# Patient Record
Sex: FEMALE | Race: WHITE | NOT HISPANIC OR LATINO | Employment: OTHER | URBAN - METROPOLITAN AREA
[De-identification: names, ages, dates, MRNs, and addresses within clinical notes are randomized per-mention and may not be internally consistent; named-entity substitution may affect disease eponyms.]

---

## 2020-08-26 ENCOUNTER — OFFICE VISIT (OUTPATIENT)
Dept: ORTHOPEDIC SURGERY | Facility: CLINIC | Age: 50
End: 2020-08-26

## 2020-08-26 VITALS — HEIGHT: 68 IN | HEART RATE: 85 BPM | BODY MASS INDEX: 18.64 KG/M2 | WEIGHT: 123 LBS | OXYGEN SATURATION: 95 %

## 2020-08-26 DIAGNOSIS — M79.672 LEFT FOOT PAIN: Primary | ICD-10-CM

## 2020-08-26 DIAGNOSIS — M25.571 RIGHT ANKLE PAIN, UNSPECIFIED CHRONICITY: ICD-10-CM

## 2020-08-26 DIAGNOSIS — M21.6X2 ACQUIRED METATARSUS VARUS OF LEFT FOOT: ICD-10-CM

## 2020-08-26 PROCEDURE — 99204 OFFICE O/P NEW MOD 45 MIN: CPT | Performed by: ORTHOPAEDIC SURGERY

## 2020-08-26 RX ORDER — LAMOTRIGINE 200 MG/1
300 TABLET ORAL DAILY
COMMUNITY
End: 2022-12-06

## 2020-08-26 RX ORDER — PERPHENAZINE 16 MG/1
16 TABLET ORAL DAILY
COMMUNITY

## 2020-08-26 NOTE — PROGRESS NOTES
NEW PATIENT    Patient: Roberta Stoddard  : 1970    Primary Care Provider: Rosmery Seaman MD    Requesting Provider: As above    Pain of the Left Foot      History    Chief Complaint: Left bunion pain, right ankle pain    History of Present Illness: This is an extremely pleasant 50-year-old woman with 2-year history of left bunion pain.  She has milder bunion on the right with no pain.  She has changed her shoes, she has difficulty with any type of shoe wear.  She does not have any family history of bunions.  She reports that her mother told her she has the bunion because she wore pointy toed shoes.  I explained that bunions are hereditary often, but they indeed can be made worse by shoe wear.  The right ankle pain has been present for a month or 2, it only hurts with pressure over the sinus Tarsi area.  She does not remember any injury, she has not noticed any swelling, no locking, no giving way.  She reports that she has been thinking about bunion surgery, and she thinks it might be time to pursue this because of the persistent pain.  She notes that it often throbs at night, I explained that is due to pressure on the digital nerve    Current Outpatient Medications on File Prior to Visit   Medication Sig Dispense Refill   • FLUoxetine HCl (PROZAC PO) Take 60 mg by mouth.     • lamoTRIgine (LaMICtal) 200 MG tablet Take 300 mg by mouth Daily.     • perphenazine 16 MG tablet Take 16 mg by mouth Daily.       No current facility-administered medications on file prior to visit.       Allergies   Allergen Reactions   • Morphine Other (See Comments)     blackout      History reviewed. No pertinent past medical history.  Past Surgical History:   Procedure Laterality Date   •  SECTION     • COLONOSCOPY      partial   • NASAL SEPTUM SURGERY       Family History   Problem Relation Age of Onset   • No Known Problems Mother    • Cancer Father       Social History     Socioeconomic History   • Marital  "status:      Spouse name: Not on file   • Number of children: Not on file   • Years of education: Not on file   • Highest education level: Not on file   Tobacco Use   • Smoking status: Never Smoker   • Smokeless tobacco: Never Used   Substance and Sexual Activity   • Alcohol use: Yes     Frequency: Never   • Drug use: Never   • Sexual activity: Defer        Review of Systems   Constitutional: Negative.    HENT: Negative.    Eyes: Negative.    Respiratory: Negative.    Cardiovascular: Negative.    Gastrointestinal: Negative.    Endocrine: Negative.    Genitourinary: Negative.    Musculoskeletal: Positive for arthralgias and joint swelling.   Skin: Negative.    Allergic/Immunologic: Negative.    Neurological: Negative.    Hematological: Negative.    Psychiatric/Behavioral: Negative.        The following portions of the patient's history were reviewed and updated as appropriate: allergies, current medications, past family history, past medical history, past social history, past surgical history and problem list.    Physical Exam:   Pulse 85   Ht 171.5 cm (67.5\")   Wt 55.8 kg (123 lb)   SpO2 95%   Breastfeeding No   BMI 18.98 kg/m²   GENERAL: Body habitus: normal weight for height    Lower extremity edema: Right: none; Left: none    Varicose veins:  Right: none; Left: none    Gait: normal     Mental Status:  awake and alert; oriented to person, place, and time    Voice:  clear  SKIN:  Lower extremity: Normal    Hair Growth(lower extremity):  Right:normal; Left:  normal  NAILS: Toenails: normal  HEENT: Head: Normocephalic, atraumatic,  without obvious abnormality.  eye: normal external eye, no icterus  ears:normal external ears  PULM:  Repiratory effort normal  CV:  Dorsalis Pedis:  Right: 2+; Left:2+    Posterior Tibial: Right:2+; Left:2+    Capillary Refill:  Brisk  MSK:  Hand:sensation intact, no laxity      Tibia:  Right:  non tender; Left:  non tender      Ankle:  Right: ROM  normal and symmetric, motor " function  normal and Mildly tender over the ATFL and the sinus Tarsi, no swelling, normal range of motion, stable to anterior drawer; Left:  non tender, ROM  normal and symmetric and motor function  normal      Foot:  Right:  non tender; Left:  Very tender over the bunion, moderate hallux valgus metatarsus primus varus, moderate hallux valgus interphalangeus, no pain with a grind test, otherwise nontender today, she notes that she intermittently has pain in the metatarsal head region      NEURO: Heel Walking:  Right:  normal; Left:  normal    Toe Walking:  Right:  normal; Left:  Normal, with some pain under the second and third metatarsal heads     [place] 5.07 monofilament test: normal    Lower extremity sensation: intact     Reflexes:  Biceps:  Right:  not tested; Left:  not tested           Quads:  Right:  not tested; Left:  not tested           Ankle:  Right:  not tested; Left:  not tested      Calf Atrophy:none    Motor Function: all 5/5         Medical Decision Making    Data Review:   ordered and reviewed x-rays today    Assessment and Plan/ Diagnosis/Treatment options:   1. Acquired metatarsus varus of left foot  Painful bunion, hallux valgus metatarsus primus varus on the left. I explained the mechanical nature of the problem.  I explained that the bunion is not a growth on the foot but rather it is a widening of the angle between the first and second metatarsals.  I drew a picture on the foot and explained the problem in detail.  I explained that bunions are a result of heredity and shoe wear.  The reason they hurt is from shoes pushing on the bunion.      I explained that first line treatment is conservative.  Wide, round toed shoes  can help them be comfortable.  Sometimes custom orthotics can help if they also have metatarsalgia.  I explained that there is no brace that works to change the bunion angle.      If conservative treatment does not help the pain enough then the patient might consider  surgery. Pain is the only indication for surgery.   I advised them that I never talk anyone into bunion surgery, only the patient can decide when or if the pain is enough to undergo surgery.      Bunion surgery helps pain.  The [employer] did a reasearch study a number of years ago and it showed that at ONE YEAR after surgery, 90% of patients were happy with the pain relief and the results.     It does not change the appearance of the foot very much, and it does not change shoe size.  The toe is permanently somewhat stiffer after surgery.  The surgery has a long recovery time, and all foot and ankle surgery swells longer than any other type of surgery.  The reason the swelling is so prolonged is gravity- the foot is below the heart and thus it swells.  The swelling can last for months, sometimes a year.  Swelling will be more prolonged in a patient who has a condition like venous stasis or chronic edema.    She has done good conservative treatment with wide round toed shoes.  She is interested in proceeding with surgery.  I would do a chevron and Akin osteotomy.         For bunions with an angle less than 15 degrees, we cut the bones in two places.  The first bone cut is in the metatarsal and this is called a [place]ron osteotomy.  The bone is cut, moved over toward the second metatarsal and held with a pin.  The pin sticks out through the skin on top of the foot.  The pin will be removed in the office at 3 weeks.  (It is pulled out with a pair of pliers, but is minimally painful)  The second bone cut is in the bone of the big toe- a small wedge is removed to straighten the toe, and it is held together internally with a wire loop.  This is the Akin osteotomy, the small wire loop is permanent.      Post op regimen: the first 6-8 weeks walking on heel in post-op shoe. No driving if it is the right foot.  Second 6-8 weeks walking normally in a wide sandal.  After 12-16 may slowly get back  into a closed shoe.  The foot will still be swollen, and a wider shoe might be needed at first.  We discussed the risks including but not limited to: death, infection, neurovascular damage, strokes, heart attacks, blood clots, chronic pain, deformity, malunion, nonunion, hardware failure, further surgery, hallux varus, stiffness,  amputation, etc.  Questions asked and answered in detail.  We also discussed what would happen if we do nothing.                 2. Right ankle pain, unspecified chronicity  I did not see any obvious abnormality on the x-ray, she does not have swelling or locking, if it continues to bother her we can talk about an MRI, she is happy to watch it right now  - XR Ankle 2 View Right                Radiology Ordered []  Radiology Reports Reviewed []      Radiology Images Reviewed []   Labs Reviewed []    Labs Ordered []   PCP Records Reviewed []    Provider Records Reviewed []    ER Records Reviewed []    Hospital Records Reviewed []    History Obtained From Family []    Phone conversation with Provider []    Records Requested []

## 2020-08-26 NOTE — PATIENT INSTRUCTIONS
CHEVRON AND AKIN OSTEOTOMY BUNION SURGERY    This surgery is for hallux valgus, metatarsus primus varus and painful bunion with an intermetatarsal angle of less than 15 degrees      The first line treatment of bunion pain is conservative.  Wide, round toed shoes and custom inserts can help them be comfortable.  If conservative treatment does not help the pain enough then the patient might consider surgery. Pain is the only reason for the surgery.   Only the patient can decide when or if the pain is enough to undergo surgery.      Bunion surgery helps pain.  The [employer] Society did a research study a number of years ago and it showed that at ONE YEAR after surgery, 90% of patients were happy with the pain relief and the results.     Bunion surgery does not change the appearance of the foot very much, and it does not change shoe size.  The toe is permanently somewhat stiffer after surgery.  The surgery has a long recovery time, and all foot and ankle surgery swells longer than any other type of surgery.  The reason the swelling is so prolonged is gravity- the foot is below the heart and thus it swells.  The swelling can last for months, sometimes a year.  Swelling will be more prolonged in a patient who has a condition like venous stasis or chronic edema.       For bunions with an angle less than 15 degrees, we cut the bones in two places.  The first bone cut is in the metatarsal and this is called a Chevron osteotomy.  The bone is cut, moved over toward the second metatarsal and held with a pin.  The pin sticks out through the skin on top of the foot.  The pin will be removed in the office at 3 weeks.  (It is pulled out with a pair of pliers, but is minimally painful)  The second bone cut is in the bone of the big toe- a small wedge is removed to straighten the toe, and it is held together internally with a wire loop.  This is the Akin osteotomy, the small wire loop is permanent.      Post  op regimen:    1. 6-8 weeks walking on heel in post-op shoe. No driving if it is the right foot.         a. week one post-op visit: dressing change and x-ray         b. week two post-op visit: dressing change, suture removal, x-ray         c. week three post-op visit: dressing change, pin removal, x-ray.  Might be allowed to get it wet at this point.          d. week six post-op visit: x-ray, if healing appropriately will be allowed into a wide sandal (might have to wait two more weeks)    2. Second 6-8 weeks walking normally in a wide sandal.    3. After 12-16 may slowly get back into a closed shoe.  The foot will still be swollen, and a wider shoe might be needed at first.

## 2020-09-04 ENCOUNTER — TELEPHONE (OUTPATIENT)
Dept: ORTHOPEDIC SURGERY | Facility: CLINIC | Age: 50
End: 2020-09-04

## 2020-09-04 NOTE — TELEPHONE ENCOUNTER
Left a voicemail to advise that Anatoly will give the patient a call once she back in the office. Glory mentioned that the patient has called already before and there is a note left for her.    Kanika

## 2020-11-02 ENCOUNTER — APPOINTMENT (OUTPATIENT)
Dept: PREADMISSION TESTING | Facility: HOSPITAL | Age: 50
End: 2020-11-02

## 2020-11-02 ENCOUNTER — TELEPHONE (OUTPATIENT)
Dept: ORTHOPEDIC SURGERY | Facility: CLINIC | Age: 50
End: 2020-11-02

## 2020-11-02 LAB
ANION GAP SERPL CALCULATED.3IONS-SCNC: 11 MMOL/L (ref 5–15)
BUN SERPL-MCNC: 8 MG/DL (ref 6–20)
BUN/CREAT SERPL: 13.8 (ref 7–25)
CALCIUM SPEC-SCNC: 9.8 MG/DL (ref 8.6–10.5)
CHLORIDE SERPL-SCNC: 99 MMOL/L (ref 98–107)
CO2 SERPL-SCNC: 27 MMOL/L (ref 22–29)
CREAT SERPL-MCNC: 0.58 MG/DL (ref 0.57–1)
DEPRECATED RDW RBC AUTO: 48.7 FL (ref 37–54)
ERYTHROCYTE [DISTWIDTH] IN BLOOD BY AUTOMATED COUNT: 13.2 % (ref 12.3–15.4)
GFR SERPL CREATININE-BSD FRML MDRD: 109 ML/MIN/1.73
GLUCOSE SERPL-MCNC: 93 MG/DL (ref 65–99)
HBA1C MFR BLD: 5.4 % (ref 4.8–5.6)
HCT VFR BLD AUTO: 42.1 % (ref 34–46.6)
HGB BLD-MCNC: 13.7 G/DL (ref 12–15.9)
MCH RBC QN AUTO: 32.5 PG (ref 26.6–33)
MCHC RBC AUTO-ENTMCNC: 32.5 G/DL (ref 31.5–35.7)
MCV RBC AUTO: 100 FL (ref 79–97)
PLATELET # BLD AUTO: 294 10*3/MM3 (ref 140–450)
PMV BLD AUTO: 9.7 FL (ref 6–12)
POTASSIUM SERPL-SCNC: 4.3 MMOL/L (ref 3.5–5.2)
QT INTERVAL: 440 MS
QTC INTERVAL: 431 MS
RBC # BLD AUTO: 4.21 10*6/MM3 (ref 3.77–5.28)
SODIUM SERPL-SCNC: 137 MMOL/L (ref 136–145)
WBC # BLD AUTO: 6.29 10*3/MM3 (ref 3.4–10.8)

## 2020-11-02 PROCEDURE — 83036 HEMOGLOBIN GLYCOSYLATED A1C: CPT | Performed by: ORTHOPAEDIC SURGERY

## 2020-11-02 PROCEDURE — 36415 COLL VENOUS BLD VENIPUNCTURE: CPT

## 2020-11-02 PROCEDURE — C9803 HOPD COVID-19 SPEC COLLECT: HCPCS

## 2020-11-02 PROCEDURE — U0004 COV-19 TEST NON-CDC HGH THRU: HCPCS

## 2020-11-02 PROCEDURE — 93005 ELECTROCARDIOGRAM TRACING: CPT

## 2020-11-02 PROCEDURE — 93010 ELECTROCARDIOGRAM REPORT: CPT | Performed by: INTERNAL MEDICINE

## 2020-11-02 PROCEDURE — 85027 COMPLETE CBC AUTOMATED: CPT | Performed by: ORTHOPAEDIC SURGERY

## 2020-11-02 PROCEDURE — 80048 BASIC METABOLIC PNL TOTAL CA: CPT | Performed by: ORTHOPAEDIC SURGERY

## 2020-11-03 ENCOUNTER — TELEPHONE (OUTPATIENT)
Dept: ORTHOPEDIC SURGERY | Facility: CLINIC | Age: 50
End: 2020-11-03

## 2020-11-03 LAB — SARS-COV-2 RNA RESP QL NAA+PROBE: NOT DETECTED

## 2020-11-03 NOTE — TELEPHONE ENCOUNTER
I called and spoke with Ms. Malaika she said she twisted her left ankle going down stairs and she is having surgery on Thursday. I confirmed with Miranda to bring her in tomorrow to see  to make sure her ankle is ok before she has her surgery.

## 2020-11-03 NOTE — TELEPHONE ENCOUNTER
PATIENT HAS SURGERY SCHEDULED FOR THIS Thursday, November 5TH BUT TWISTED HER ANKLE OF THE SAME FOOT SHE IS SCHEDULED TO HAVE SURGERY ON. IT IS SLIGHTLY SWOLLEN AND A LITTLE BRUISED.SHE CAN WALK ON IT. SHE WANTS TO VERIFY THAT IT IS STILL OKAY TO GO AHEAD WITH HER SURGERY.

## 2020-11-04 ENCOUNTER — OFFICE VISIT (OUTPATIENT)
Dept: ORTHOPEDIC SURGERY | Facility: CLINIC | Age: 50
End: 2020-11-04

## 2020-11-04 ENCOUNTER — TRANSCRIBE ORDERS (OUTPATIENT)
Dept: GENERAL RADIOLOGY | Facility: HOSPITAL | Age: 50
End: 2020-11-04

## 2020-11-04 VITALS — WEIGHT: 123.02 LBS | BODY MASS INDEX: 18.64 KG/M2 | HEIGHT: 68 IN | OXYGEN SATURATION: 98 % | HEART RATE: 77 BPM

## 2020-11-04 DIAGNOSIS — M25.572 LEFT ANKLE PAIN, UNSPECIFIED CHRONICITY: Primary | ICD-10-CM

## 2020-11-04 DIAGNOSIS — M21.6X2 ACQUIRED CAVOVARUS FOOT DEFORMITY, LEFT: Primary | ICD-10-CM

## 2020-11-04 DIAGNOSIS — M21.6X2 ACQUIRED METATARSUS VARUS OF LEFT FOOT: Primary | ICD-10-CM

## 2020-11-04 PROCEDURE — 99213 OFFICE O/P EST LOW 20 MIN: CPT | Performed by: ORTHOPAEDIC SURGERY

## 2020-11-04 RX ORDER — HYDROCODONE BITARTRATE AND ACETAMINOPHEN 7.5; 325 MG/1; MG/1
1-2 TABLET ORAL EVERY 6 HOURS PRN
Qty: 40 TABLET | Refills: 0 | Status: SHIPPED | OUTPATIENT
Start: 2020-11-04 | End: 2020-11-25

## 2020-11-04 RX ORDER — OXYCODONE HYDROCHLORIDE AND ACETAMINOPHEN 5; 325 MG/1; MG/1
1-2 TABLET ORAL EVERY 6 HOURS PRN
Qty: 40 TABLET | Refills: 0 | Status: SHIPPED | OUTPATIENT
Start: 2020-11-04 | End: 2020-11-25

## 2020-11-04 RX ORDER — ONDANSETRON 4 MG/1
4 TABLET, FILM COATED ORAL EVERY 6 HOURS PRN
Qty: 10 TABLET | Refills: 1 | Status: SHIPPED | OUTPATIENT
Start: 2020-11-04 | End: 2020-11-25

## 2020-11-04 NOTE — TELEPHONE ENCOUNTER
CALLED PATIENT TO ADVISE OF 10:15AM ARRIVAL TIME FOR SURGERY ON 11/5/20 WITH DR. DOTSON, NO ANSWER.  LVM WITH DETAILS AND REQUEST TO RETURN MY CALL CONFIRMING RECEIPT OF MESSAGE.

## 2020-11-04 NOTE — PROGRESS NOTES
ESTABLISHED PATIENT    Patient: Roberta Dai  : 1970    Primary Care Provider: Rosmery Seaman MD    Requesting Provider: As above    Pain of the Left Ankle      History    Chief Complaint: Left foot and ankle injury    History of Present Illness: She returns unexpectedly, we plan for left foot surgery tomorrow.  She missed a step in her garage 3 days ago and fell and has had pain and swelling over the left ankle since then.  She wants to make sure nothing is broken and that it is okay to proceed with surgery tomorrow.  She does not remember exactly what happened to her foot.    Current Outpatient Medications on File Prior to Visit   Medication Sig Dispense Refill   • FLUoxetine HCl (PROZAC PO) Take 60 mg by mouth.     • lamoTRIgine (LaMICtal) 200 MG tablet Take 300 mg by mouth Daily.     • perphenazine 16 MG tablet Take 16 mg by mouth Daily.       No current facility-administered medications on file prior to visit.       Allergies   Allergen Reactions   • Morphine Other (See Comments)     blackout      History reviewed. No pertinent past medical history.  Past Surgical History:   Procedure Laterality Date   •  SECTION     • COLONOSCOPY      partial   • NASAL SEPTUM SURGERY       Family History   Problem Relation Age of Onset   • No Known Problems Mother    • Cancer Father       Social History     Socioeconomic History   • Marital status:      Spouse name: Not on file   • Number of children: Not on file   • Years of education: Not on file   • Highest education level: Not on file   Tobacco Use   • Smoking status: Never Smoker   • Smokeless tobacco: Never Used   Substance and Sexual Activity   • Alcohol use: Yes     Frequency: Never   • Drug use: Never   • Sexual activity: Defer        Review of Systems   Constitutional: Negative.    HENT: Negative.    Eyes: Negative.    Respiratory: Negative.    Cardiovascular: Negative.    Gastrointestinal: Negative.    Endocrine: Negative.   "  Genitourinary: Negative.    Musculoskeletal: Positive for arthralgias.   Skin: Negative.    Allergic/Immunologic: Negative.    Neurological: Negative.    Hematological: Negative.    Psychiatric/Behavioral: Negative.        The following portions of the patient's history were reviewed and updated as appropriate: allergies, current medications, past family history, past medical history, past social history, past surgical history and problem list.    Physical Exam:   Pulse 77   Ht 171.5 cm (67.52\")   Wt 55.8 kg (123 lb 0.3 oz)   SpO2 98%   BMI 18.97 kg/m²   GENERAL: Body habitus: normal weight for height    Lower extremity edema: Left: trace; Right: none    Gait: antalgic     Mental Status:  awake and alert; oriented to person, place, and time  MSK:  Tibia:  Right:  non tender; Left:  non tender        Ankle:  Right: non tender; Left:  Tender across the anterior aspect of the ankle, most tender of the talonavicular joint, mild anterior swelling and ecchymosis, mildly tender over the ATFL, nontender over the medial malleolus nontender over the fibula no change in the bunion, sensation intact            Medical Decision Making    Data Review:   ordered and reviewed x-rays today    Assessment/Plan/Diagnosis/Treatment Options:   1. Left ankle pain, unspecified chronicity  She has a small avulsion at the dorsal aspect of the head of the talus, I think she had a plantarflexion injury to the talonavicular joint.  No other fractures that I can see, no displacement.  I do not think we need to postpone the surgery, she was very relieved.  She is anxious to continue.  I will just treat her with a tall boot instead of a postop shoe.  I do not think the foot injury needs surgery.  We will continue with surgery tomorrow.  Questions asked and answered in detail.  - XR Ankle 3+ View Left        Silvia Crooks MD                      "

## 2020-11-05 ENCOUNTER — HOSPITAL ENCOUNTER (OUTPATIENT)
Dept: GENERAL RADIOLOGY | Facility: HOSPITAL | Age: 50
End: 2020-11-05

## 2020-11-05 ENCOUNTER — OUTSIDE FACILITY SERVICE (OUTPATIENT)
Dept: ORTHOPEDIC SURGERY | Facility: CLINIC | Age: 50
End: 2020-11-05

## 2020-11-05 PROCEDURE — 28299 COR HLX VLGS DOUBLE OSTEOT: CPT | Performed by: ORTHOPAEDIC SURGERY

## 2020-11-05 PROCEDURE — 28299 COR HLX VLGS DOUBLE OSTEOT: CPT | Performed by: PHYSICIAN ASSISTANT

## 2020-11-11 ENCOUNTER — OFFICE VISIT (OUTPATIENT)
Dept: ORTHOPEDIC SURGERY | Facility: CLINIC | Age: 50
End: 2020-11-11

## 2020-11-11 DIAGNOSIS — Z47.89 AFTERCARE FOLLOWING SURGERY OF THE MUSCULOSKELETAL SYSTEM: Primary | ICD-10-CM

## 2020-11-11 PROCEDURE — 29550 STRAPPING OF TOES: CPT | Performed by: ORTHOPAEDIC SURGERY

## 2020-11-11 PROCEDURE — 99024 POSTOP FOLLOW-UP VISIT: CPT | Performed by: ORTHOPAEDIC SURGERY

## 2020-11-11 NOTE — PROGRESS NOTES
Post-op (1 week s/p (L) Chevron/akin osteotomy 2020)      Roberta Dai is 1 week status post left timothy Lisa, 10/5/2020. She reports no fever, chills.  She reports pain is well controlled.  They have been taking aspirin for DVT prophylaxis.  They have been weight bearing in boot.      Past Surgical History:   Procedure Laterality Date   •  SECTION     • COLONOSCOPY      partial   • NASAL SEPTUM SURGERY         There were no vitals taken for this visit.        Good alignment, no erythema, no drainage, no sign of infection, normal post op swelling left foot pin site clean.   less swelling inankle    ordered and reviewed x-rays today    Assessment and Plan:   1. Aftercare following surgery of the musculoskeletal system  Doing well, I replaced her into a bunion strapping dressing.  She is in a boot because she injured the ankle before surgery.  She has a small avulsion off the head of the talus.  It is in appropriate alignment on the x-ray.  I will see her again in a week for an x-ray of the foot, to probably remove sutures      Using gauze, cling and ace wrap I placed a bunion strapping dressing to put the toe in appropriate alignment    - XR Foot 2 View Left          Silvia Crooks MD

## 2020-11-18 ENCOUNTER — OFFICE VISIT (OUTPATIENT)
Dept: ORTHOPEDIC SURGERY | Facility: CLINIC | Age: 50
End: 2020-11-18

## 2020-11-18 DIAGNOSIS — Z47.89 AFTERCARE FOLLOWING SURGERY OF THE MUSCULOSKELETAL SYSTEM: Primary | ICD-10-CM

## 2020-11-18 PROCEDURE — 99024 POSTOP FOLLOW-UP VISIT: CPT | Performed by: ORTHOPAEDIC SURGERY

## 2020-11-18 NOTE — PROGRESS NOTES
Post-op Follow-up (1 week f/u; 2 weeks s/p (L) Chevron/akin osteotomy 2020)      Roberta Dai is 2 weeks status post left chevron/akin, 2020. She reports no fever, chills.  She reports pain is well controlled.  They have been taking aspirin for DVT prophylaxis.  They have been weight bearing in boot.      Past Surgical History:   Procedure Laterality Date   •  SECTION     • COLONOSCOPY      partial   • NASAL SEPTUM SURGERY         There were no vitals taken for this visit.        Good alignment, no erythema, no drainage, no sign of infection, normal post op swelling left foot, pin site clean    ordered and reviewed x-rays today    Assessment and Plan:   1. Aftercare following surgery of the musculoskeletal system  Doing well, we removed sutures, continue to walk in boot, return 1 week for xray for likely pin removan  - XR Foot 2 View Left          Silvia Crooks MD

## 2020-11-25 ENCOUNTER — OFFICE VISIT (OUTPATIENT)
Dept: ORTHOPEDIC SURGERY | Facility: CLINIC | Age: 50
End: 2020-11-25

## 2020-11-25 DIAGNOSIS — M21.6X2 ACQUIRED METATARSUS VARUS OF LEFT FOOT: Primary | ICD-10-CM

## 2020-11-25 PROCEDURE — 99024 POSTOP FOLLOW-UP VISIT: CPT | Performed by: ORTHOPAEDIC SURGERY

## 2020-11-25 RX ORDER — SECUKINUMAB 150 MG/ML
INJECTION SUBCUTANEOUS
COMMUNITY
Start: 2020-11-22

## 2020-11-25 NOTE — PROGRESS NOTES
Post-op Follow-up (3 weeks s/p (L) [place]/akin osteotomy 2020))      Roberta Dai is 3 weeks status post left timothy/akin, 2020. She reports no fever, chills.  She reports pain is well controlled.  They have been taking aspirin for DVT prophylaxis.  They have been weight bearing in boot.      Past Surgical History:   Procedure Laterality Date   •  SECTION     • COLONOSCOPY      partial   • NASAL SEPTUM SURGERY         There were no vitals taken for this visit.        Good alignment, no erythema, no drainage, no sign of infection, normal post op swelling left foot, pin site clean    ordered and reviewed x-rays today    Assessment and Plan:   1. Acquired metatarsus varus of left foot  Doing well, I removed the pin, she may walk normally in boot,  Ok to shower, no soaking until all scabs are gone, continue to work on ROM great toe, I placed medium toe spacer, return 3 weeks for xray  - XR Foot 2 View Left          Silvia Crooks MD

## 2020-12-16 ENCOUNTER — OFFICE VISIT (OUTPATIENT)
Dept: ORTHOPEDIC SURGERY | Facility: CLINIC | Age: 50
End: 2020-12-16

## 2020-12-16 DIAGNOSIS — M21.6X2 ACQUIRED METATARSUS VARUS OF LEFT FOOT: Primary | ICD-10-CM

## 2020-12-16 DIAGNOSIS — M25.572 LEFT ANKLE PAIN, UNSPECIFIED CHRONICITY: ICD-10-CM

## 2020-12-16 PROCEDURE — 99024 POSTOP FOLLOW-UP VISIT: CPT | Performed by: ORTHOPAEDIC SURGERY

## 2020-12-16 NOTE — PROGRESS NOTES
Post-op Follow-up ((3 weeks recheck -6 weeks s/p (L) Chevron/akin osteotomy 2020))      Roberta Dai is 6 weeks status post left timothy Gonzalez, 2020. She reports no fever, chills.  She reports pain is well controlled.  They have been taking aspirin for DVT prophylaxis.  They have been weight bearing as tolerated in boot.      Past Surgical History:   Procedure Laterality Date   •  SECTION     • COLONOSCOPY      partial   • FOOT SURGERY Left 2020    (L) CHEVRON/AKIN OSTEOTOMY Dr. Flores   • NASAL SEPTUM SURGERY         There were no vitals taken for this visit.        Good alignment, no erythema, no drainage, no sign of infection, normal post op swelling left foot, not tender over talus, great toe has 15 degrees dorsiflexion plantarflexion    ordered and reviewed x-rays today    Assessment and Plan:   1. Acquired metatarsus varus of left foot  She may now come out of the boot.  I want her to go to physical therapy for the ankle injury.  They may also work on range of motion of the great toe.  I encouraged her to work on it on her own.  I will see her again in 6 weeks with standing 2 views of the foot  - XR Foot 2 View Left          Silvia Flores MD

## 2020-12-31 ENCOUNTER — TREATMENT (OUTPATIENT)
Dept: PHYSICAL THERAPY | Facility: CLINIC | Age: 50
End: 2020-12-31

## 2020-12-31 DIAGNOSIS — M25.572 ACUTE LEFT ANKLE PAIN: Primary | ICD-10-CM

## 2020-12-31 PROCEDURE — 97161 PT EVAL LOW COMPLEX 20 MIN: CPT | Performed by: PHYSICAL THERAPIST

## 2020-12-31 PROCEDURE — 97110 THERAPEUTIC EXERCISES: CPT | Performed by: PHYSICAL THERAPIST

## 2020-12-31 NOTE — PROGRESS NOTES
Physical Therapy Initial Evaluation and Plan of Care      Patient: Roberta Dai   : 1970  Diagnosis/ICD-10 Code:  The encounter diagnosis was Acute left ankle pain.   Referring practitioner: Silvia Flores MD  Date of Initial Visit: Type: THERAPY  Noted: 2020  Today's Date: 2020  Patient seen for 1 sessions         Roberta Dai reports:  She is recovering well after talar avulsion fracture and L bunionectomy.  Subjective Questionnaire: LEFS: 69/80    Subjective Evaluation    History of Present Illness  Date of onset: 2020  Date of surgery: 2020  Mechanism of injury: Pt sustained left talar avulsion fracture 2020 when she tripped, going down the stairs into her garage.  She was already scheduled to have L bunionectomy.  This was done on 2020.  She was then in a walking boot for about 6 weeks.  She came out of the boot within the last 2 weeks.  She is referred to PT to regain ROM, strength, and function.    Subjective comment: mild L foot and ankle discomfort  Patient Occupation:  [employer] work at home.  Likes to walk dog. Pain  Current pain ratin  At best pain ratin  At worst pain ratin  Quality: tight  Relieving factors: rest and medications  Aggravating factors: squatting and repetitive movement  Progression: improved    Social Support  Lives in: multiple-level home    Patient Goals  Patient goal: resume PLOF         Treatment  Exercise 1  Exercise Name 1: Initial HEP education and practice provided in clinic today.         Functional Testing  Functional Tests Options: Lower Extremity Functional Index   Objective          Observations     Additional Ankle/Foot Observation Details  Well-healed L bunionectomy incision, mild swelling in L foot and ankle.  Normal gait pattern.    Tenderness   Left Ankle/Foot   Tenderness in the anterior ankle and metatarsal head.     Neurological Testing     Sensation     Ankle/Foot    Left Ankle/Foot   Intact: light touch    Right Ankle/Foot   Intact: light touch     Active Range of Motion   Left Ankle/Foot   Dorsiflexion (kf): 10 degrees   Plantar flexion: 46 degrees   Inversion: 35 degrees   Eversion: 14 degrees   Great toe flexion: 0 degrees   Great toe extension: 30 degrees   Lesser toes: WFL    Right Ankle/Foot   Dorsiflexion (ke): 10 degrees   Dorsiflexion (kf): 15 degrees   Plantar flexion: 59 degrees   Inversion: 40 degrees   Eversion: 25 degrees   Great toe flexion: 5 degrees   Great toe extension: 65 degrees   Lesser toes: WFL    Additional Active Range of Motion Details  L DF-knee extended: -5 degrees    Joint Play     Additional Joint Play Details  L first metatarsal head/MTP joint hypomobile    Strength/Myotome Testing     Left Ankle/Foot   Normal strength    Right Ankle/Foot   Normal strength    Swelling   Left Ankle/Foot   Metatarsal heads: 22.7 cm  Malleoli: 25 cm    Right Ankle/Foot   Metatarsal heads: 22.3 cm  Malleoli: 24 cm          Assessment & Plan     Assessment  Impairments: abnormal or restricted ROM and lacks appropriate home exercise program  Assessment details: Pt is recovering well and is motivated to continue to regain functional abilities.  She will benefit from PT intervention to address ROM deficits in left foot and ankle.  Prognosis: good  Functional Limitations: stooping  Goals  Plan Goals:  4 weeks    Pt. demonstrates independence and compliance with initial HEP.  AROM of L great toe and left ankle shows improvement over baseline measures.  Pt is able to resume her usual daily activities.      Plan  Therapy options: will be seen for skilled physical therapy services  Planned therapy interventions: manual therapy, neuromuscular re-education, soft tissue mobilization, strengthening, stretching, therapeutic activities, joint mobilization, home exercise program, gait training, functional ROM exercises, flexibility and balance/weight-bearing training  Frequency:  1x week  Duration in visits: 4  Treatment plan discussed with: patient  Plan details: PT weekly per POC, at least until MD follow up in late January.  Emphasize, ROM, flexibility, advanced balance and functional abilities.        Timed:  Manual Therapy:         mins  83661;  Therapeutic Exercise:    10     mins  63377;     Neuromuscular Domonique:        mins  24949;    Therapeutic Activity:          mins  22087;     Gait Training:           mins  55852;     Ultrasound:          mins  49586;    Electrical Stimulation:         mins  71311 ( );    Untimed:  Electrical Stimulation:         mins  26699 ( );  Mechanical Traction:         mins  76350;     Timed Treatment:   10   mins   Total Treatment:     45   mins    PT SIGNATURE: Anju Fregoso, PT   DATE TREATMENT INITIATED: 12/31/2020    Initial Certification  Certification Period: 3/31/2021  I certify that the therapy services are furnished while this patient is under my care.  The services outlined above are required by this patient, and will be reviewed every 90 days.     PHYSICIAN: Silvia Flores MD      DATE:     Please sign and return via fax to [phone].. Thank you, [facility].

## 2021-01-14 ENCOUNTER — TREATMENT (OUTPATIENT)
Dept: PHYSICAL THERAPY | Facility: CLINIC | Age: 51
End: 2021-01-14

## 2021-01-14 DIAGNOSIS — M25.572 ACUTE LEFT ANKLE PAIN: Primary | ICD-10-CM

## 2021-01-14 PROCEDURE — 97110 THERAPEUTIC EXERCISES: CPT | Performed by: PHYSICAL THERAPIST

## 2021-01-14 PROCEDURE — 97140 MANUAL THERAPY 1/> REGIONS: CPT | Performed by: PHYSICAL THERAPIST

## 2021-01-14 NOTE — PROGRESS NOTES
Physical Therapy Daily Progress Note  VISIT: 2      Roberta Dai reports: she is feeling well and is doing her usual activities.  She notices morning stiffness but she does not have pain in her left foot.    Subjective     Treatment  Pre-treatment pain:  0  Post-treatment pain:  0  Exercise 1  Exercise Name 1: Gait observation on level surface and stairs.  Exercise 2  Exercise Name 2: Review of HEP.  Exercise 3  Exercise Name 3: Additional practice was performed with self-mobilization of L first MTP joint.  Exercise 4  Exercise Name 4: Toe flexion/extension    Manual Rx 1  Manual Rx 1 Location: L first MTP, metatarsals  Manual Rx 1 Type: Mobilization and passive stretching of L first MTP into flexion and extension, first metatarsal glide        Objective     Assessment & Plan     Assessment  Assessment details: Pt is pleased with her progress.  She required additional practice with self-mobilization of first MTP, but otherwise is doing well with exercises and with functional activities.    Plan  Plan details: Follow up next week.  Continue to address mild stiffness in L first MTP.               Timed:  Manual Therapy:    10     mins  49451;  Therapeutic Exercise:    20     mins  79715;     Neuromuscular Domonique:        mins  79614;    Therapeutic Activity:          mins  98981;     Gait Training:           mins  67491;     Ultrasound:          mins  09428;    Electrical Stimulation:         mins  40071 ( );    Untimed:  Electrical Stimulation:         mins  48913 ( );  Mechanical Traction:         mins  40563;     Timed Treatment:   30   mins   Total Treatment:     30   mins      Anju Fregoso, PT  Physical Therapist

## 2021-01-21 ENCOUNTER — TREATMENT (OUTPATIENT)
Dept: PHYSICAL THERAPY | Facility: CLINIC | Age: 51
End: 2021-01-21

## 2021-01-21 DIAGNOSIS — M25.572 ACUTE LEFT ANKLE PAIN: Primary | ICD-10-CM

## 2021-01-21 PROCEDURE — 97140 MANUAL THERAPY 1/> REGIONS: CPT | Performed by: PHYSICAL THERAPIST

## 2021-01-21 PROCEDURE — 97110 THERAPEUTIC EXERCISES: CPT | Performed by: PHYSICAL THERAPIST

## 2021-01-21 NOTE — PROGRESS NOTES
Physical Therapy Daily Progress Note  VISIT: 3      Roberta Dai reports: no pain, unless she is stretching.  She has resumed her usual routine, including walking her dog.  She notices reduction in swelling and is able to wear all of her shoes now.    Subjective     Treatment  Pre-treatment pain:  0  Post-treatment pain:  0  Exercise 1  Exercise Name 1: Brief HEP review  Exercise 2  Exercise Name 2: ROM and flexibility check    Manual Rx 1  Manual Rx 1 Location: L first MTP and metatarsal  Manual Rx 1 Type: L first metatarsal mobilization and passive motion into flexion and extension, first metatarsal glide.  Manual Rx 2  Manual Rx 2 Location: L ankle  Manual Rx 2 Type: talar mobilization and passive stretch to improve plantarflexion        Objective          Active Range of Motion   Left Ankle/Foot   Dorsiflexion (ke): 0 degrees   Dorsiflexion (kf): 10 degrees   Plantar flexion: 46 degrees   Inversion: 45 degrees   Eversion: 15 degrees   Great toe flexion: 15 degrees   Great toe extension: 35 degrees     Additional Active Range of Motion Details  L gastroc flexibility to 0 degrees (initially -5 degrees)        Assessment & Plan     Assessment  Assessment details: Pt demonstrates improvement in pain, swelling, ROM, and function.    Plan  Plan details: Pt will follow up with MD next week.  She has 2 additional follow up appointments scheduled in PT if needed.               Timed:  Manual Therapy:    15     mins  47001;  Therapeutic Exercise:    15     mins  43427;     Neuromuscular Domonique:        mins  73737;    Therapeutic Activity:          mins  60166;     Gait Training:           mins  49639;     Ultrasound:          mins  99123;    Electrical Stimulation:         mins  04934 ( );    Untimed:  Electrical Stimulation:         mins  30315 ( );  Mechanical Traction:         mins  47908;     Timed Treatment:   30   mins   Total Treatment:     30   mins      Anju Fregoso PT  Physical  Therapist

## 2021-01-27 ENCOUNTER — OFFICE VISIT (OUTPATIENT)
Dept: ORTHOPEDIC SURGERY | Facility: CLINIC | Age: 51
End: 2021-01-27

## 2021-01-27 DIAGNOSIS — M21.6X2 ACQUIRED METATARSUS VARUS OF LEFT FOOT: Primary | ICD-10-CM

## 2021-01-27 PROCEDURE — 99024 POSTOP FOLLOW-UP VISIT: CPT | Performed by: ORTHOPAEDIC SURGERY

## 2021-01-27 RX ORDER — LEVOMEFOLATE CALCIUM 15 MG
TABLET ORAL
COMMUNITY
Start: 2021-01-13

## 2021-01-27 NOTE — PROGRESS NOTES
Post-op Follow-up (6 weeks recheck -9 weeks s/p (L) Chevron/akin osteotomy 2020)      Roberta Dai is 10 weeks status post left chevron/akin, 2020. She reports no fever, chills.  She reports pain is well controlled.  They have been taking off meds now for DVT prophylaxis.  They have been weight bearing as tolerated.      Past Surgical History:   Procedure Laterality Date   •  SECTION     • COLONOSCOPY      partial   • FOOT SURGERY Left 2020    (L) CHEVRON/AKIN OSTEOTOMY Dr. Flores   • NASAL SEPTUM SURGERY         There were no vitals taken for this visit.        Good alignment, no erythema, no drainage, no sign of infection, normal post op swelling left foot, normal ankle ROM, great toe 30 deg df/pf    ordered and reviewed x-rays today    Assessment and Plan:   1. Acquired metatarsus varus of left foot  Doing well, normal ankle ROM (had ankle injury pre-op) and very good ROM of toe- 30 deg df and pf, she may increase activity as tolerated, do PT at home, see me in 3 months for final xray  - XR Foot 2 View Left          Silvia Flores MD

## 2021-02-04 ENCOUNTER — DOCUMENTATION (OUTPATIENT)
Dept: PHYSICAL THERAPY | Facility: CLINIC | Age: 51
End: 2021-02-04

## 2021-02-04 NOTE — PROGRESS NOTES
Discharge Summary  Discharge Summary from Physical Therapy Report      Patient: Roberta Dai   : 1970  Diagnosis/ICD-10 Code:  There were no encounter diagnoses.   Referring practitioner: No ref. provider found  Date of Initial Visit: No linked episodes  Today's Date: 2021  Date of Last Visit: 2021     Number of Visits: 3  Discharge Status of Patient: See MD Note dated: 2021    Goals: Partially Met    Discharge Plan: Continue with current home exercise program as instructed      Date of Discharge: 2021        Anju Fregoso, PT

## 2021-04-01 ENCOUNTER — IMMUNIZATION (OUTPATIENT)
Dept: VACCINE CLINIC | Facility: HOSPITAL | Age: 51
End: 2021-04-01

## 2021-04-01 PROCEDURE — 0001A: CPT | Performed by: INTERNAL MEDICINE

## 2021-04-01 PROCEDURE — 91300 HC SARSCOV02 VAC 30MCG/0.3ML IM: CPT | Performed by: INTERNAL MEDICINE

## 2021-04-22 ENCOUNTER — APPOINTMENT (OUTPATIENT)
Dept: VACCINE CLINIC | Facility: HOSPITAL | Age: 51
End: 2021-04-22

## 2021-04-22 ENCOUNTER — IMMUNIZATION (OUTPATIENT)
Dept: VACCINE CLINIC | Facility: HOSPITAL | Age: 51
End: 2021-04-22

## 2021-04-22 PROCEDURE — 91300 HC SARSCOV02 VAC 30MCG/0.3ML IM: CPT | Performed by: INTERNAL MEDICINE

## 2021-04-22 PROCEDURE — 0002A: CPT | Performed by: INTERNAL MEDICINE

## 2021-04-28 ENCOUNTER — OFFICE VISIT (OUTPATIENT)
Dept: ORTHOPEDIC SURGERY | Facility: CLINIC | Age: 51
End: 2021-04-28

## 2021-04-28 VITALS
HEART RATE: 70 BPM | SYSTOLIC BLOOD PRESSURE: 117 MMHG | DIASTOLIC BLOOD PRESSURE: 78 MMHG | WEIGHT: 123.02 LBS | BODY MASS INDEX: 18.64 KG/M2 | HEIGHT: 68 IN

## 2021-04-28 DIAGNOSIS — M21.6X2 ACQUIRED METATARSUS VARUS OF LEFT FOOT: Primary | ICD-10-CM

## 2021-04-28 DIAGNOSIS — M25.571 ACUTE RIGHT ANKLE PAIN: ICD-10-CM

## 2021-04-28 PROCEDURE — 99213 OFFICE O/P EST LOW 20 MIN: CPT | Performed by: ORTHOPAEDIC SURGERY

## 2021-04-28 NOTE — PROGRESS NOTES
"Post-op (3 month recheck -5.5 months s/p (L) Chevron/akin osteotomy 2020)      Roberta Dai is 5 months status post left timothy Gonzalez, 2020.  She is very happy with the pain relief    Past Surgical History:   Procedure Laterality Date   •  SECTION     • COLONOSCOPY      partial   • FOOT SURGERY Left 2020    (L) CHEVRON/AKIN OSTEOTOMY Dr. Flores   • NASAL SEPTUM SURGERY         /78   Pulse 70   Ht 171.5 cm (67.52\")   Wt 55.8 kg (123 lb 0.3 oz)   BMI 18.97 kg/m²         Good alignment, no erythema, no drainage, no sign of infection, normal post op swelling 20 degrees dorsiflexion plantarflexion of the great toe, nontender in the midfoot where she had the previous injury    ordered and reviewed x-rays today    Assessment and Plan:   1. Acquired metatarsus varus of left foot  She has done very well both with surgery and the preop injury-small fracture dorsal talus.  She is released to full activity.  I will be happy to see her anytime.  - XR Foot 2 View Left    2.  Dorsal talus fracture-healed      Silvia Flores MD  "

## 2022-12-06 ENCOUNTER — OFFICE VISIT (OUTPATIENT)
Dept: OBSTETRICS AND GYNECOLOGY | Facility: CLINIC | Age: 52
End: 2022-12-06

## 2022-12-06 VITALS
HEIGHT: 68 IN | DIASTOLIC BLOOD PRESSURE: 80 MMHG | WEIGHT: 126 LBS | BODY MASS INDEX: 19.1 KG/M2 | SYSTOLIC BLOOD PRESSURE: 122 MMHG

## 2022-12-06 DIAGNOSIS — Z01.419 WELL WOMAN EXAM WITH ROUTINE GYNECOLOGICAL EXAM: Primary | ICD-10-CM

## 2022-12-06 DIAGNOSIS — Z78.0 POSTMENOPAUSAL STATUS: ICD-10-CM

## 2022-12-06 DIAGNOSIS — Z12.39 ENCOUNTER FOR BREAST CANCER SCREENING USING NON-MAMMOGRAM MODALITY: ICD-10-CM

## 2022-12-06 DIAGNOSIS — Z01.419 WOMEN'S ANNUAL ROUTINE GYNECOLOGICAL EXAMINATION: ICD-10-CM

## 2022-12-06 DIAGNOSIS — N95.1 MENOPAUSAL SYMPTOMS: ICD-10-CM

## 2022-12-06 PROCEDURE — 99386 PREV VISIT NEW AGE 40-64: CPT | Performed by: OBSTETRICS & GYNECOLOGY

## 2022-12-06 RX ORDER — ESTRADIOL 10 UG/1
1 INSERT VAGINAL 2 TIMES WEEKLY
Qty: 8 TABLET | Refills: 11 | Status: SHIPPED | OUTPATIENT
Start: 2022-12-08 | End: 2023-03-07 | Stop reason: SDUPTHER

## 2022-12-06 RX ORDER — PROGESTERONE 100 MG/1
100 CAPSULE ORAL DAILY
Qty: 90 CAPSULE | Refills: 3 | Status: SHIPPED | OUTPATIENT
Start: 2022-12-06 | End: 2023-03-07 | Stop reason: SDUPTHER

## 2022-12-06 RX ORDER — ESTERIFIED ESTROGEN AND METHYLTESTOSTERONE .625; 1.25 MG/1; MG/1
1 TABLET ORAL DAILY
Qty: 30 TABLET | Refills: 0 | Status: SHIPPED | OUTPATIENT
Start: 2022-12-06 | End: 2023-03-07 | Stop reason: SDUPTHER

## 2022-12-06 NOTE — PROGRESS NOTES
Gynecologic Annual Exam Note        GYN Annual Exam     CC - Here for annual exam.        HPI  Roberta Dai is a 52 y.o. female, No obstetric history on file., who presents for annual well woman exam as a previous patient not seen in the last three years.  She is postmenopausal. Denies vaginal bleeding.  Patient reports problems with: decreased libido, depression, hot flashes, insomnia, moodiness, no energy and vaginal dryness. There were no changes to her medical or surgical history since her last visit.. Partner Status: Marital Status: .  She is is sexually active. She has not had new partners.. STD testing recommendations have been explained to the patient and she does not desire STD testing.    Additional OB/GYN History   On HRT? No    Last Pap : 4/24/2017. Results: negative. HPV: negative  Last Completed Pap Smear     This patient has no relevant Health Maintenance data.        History of abnormal Pap smear: no  Family history of uterine, colon, breast, or ovarian cancer: no  Performs monthly Self-Breast Exam: no  Last mammogram: per pt 2017.    Last Completed Mammogram     This patient has no relevant Health Maintenance data.        Last colonoscopy: has had a colonoscopy 5 year(s) ago.    Last Completed Colonoscopy     This patient has no relevant Health Maintenance data.            Last bone density scan (DEXA): None  Exercises Regularly: yes  Feelings of Anxiety or Depression: yes - both anxiety and depression well managed with medication      Tobacco Usage?: No       Current Outpatient Medications:   •  Cosentyx Sensoready, 300 MG, 150 MG/ML solution auto-injector, , Disp: , Rfl:   •  FLUoxetine HCl (PROZAC PO), Take 40 mg by mouth., Disp: , Rfl:   •  l-methylfolate 15 MG tablet tablet, , Disp: , Rfl:   •  perphenazine 16 MG tablet, Take 16 mg by mouth Daily., Disp: , Rfl:   •  [START ON 12/8/2022] estradiol (Vagifem) 10 MCG tablet vaginal tablet, Insert 1 tablet into the vagina 2  (Two) Times a Week., Disp: 8 tablet, Rfl: 11  •  estrogens, conjugated,-methyltestosterone (ESTRATEST HS) 0.625-1.25 MG per tablet, Take 1 tablet by mouth Daily for 30 days., Disp: 30 tablet, Rfl: 0  •  Progesterone (Prometrium) 100 MG capsule, Take 1 capsule by mouth Daily., Disp: 90 capsule, Rfl: 3    Patient denies the need for medication refills today.    OB History    No obstetric history on file.         Past Medical History:   Diagnosis Date   • Depression     Struggled with depression for a long time        Past Surgical History:   Procedure Laterality Date   •  SECTION WITH TUBAL  [id number]   • COLONOSCOPY      partial   • FOOT SURGERY Left 2020    (L) CHEVRON/AKIN OSTEOTOMY Dr. Flores   • NASAL SEPTUM SURGERY     • WISDOM TOOTH EXTRACTION         Health Maintenance   Topic Date Due   • Annual Gynecologic Pelvic and Breast Exam  Never done   • COLORECTAL CANCER SCREENING  Never done   • MAMMOGRAM  2017   • ZOSTER VACCINE (1 of 2) Never done   • HEPATITIS C SCREENING  Never done   • PAP SMEAR  Never done   • COVID-19 Vaccine (4 - Booster for Pfizer series) 2022   • INFLUENZA VACCINE  Never done   • TDAP/TD VACCINES (2 - Td or Tdap) 2023   • Pneumococcal Vaccine 0-64  Aged Out       The additional following portions of the patient's history were reviewed and updated as appropriate: allergies, current medications, past family history, past medical history, past social history, past surgical history and problem list.    Review of Systems   Constitutional: Negative.    HENT: Negative.    Eyes: Negative.    Respiratory: Negative.    Cardiovascular: Negative.    Gastrointestinal: Negative.    Endocrine: Negative.    Genitourinary: Negative.    Musculoskeletal: Negative.    Skin: Negative.    Allergic/Immunologic: Negative.    Neurological: Negative.    Hematological: Negative.    Psychiatric/Behavioral: Negative.        I have reviewed and agree with the HPI, ROS, and  "historical information as entered above. Jenelle Lorenzana MD    Objective   /80   Ht 171.5 cm (67.52\")   Wt 57.2 kg (126 lb)   LMP  (LMP Unknown)   BMI 19.43 kg/m²     Physical Exam  Vitals and nursing note reviewed. Exam conducted with a chaperone present.   Constitutional:       Appearance: She is well-developed.   HENT:      Head: Normocephalic and atraumatic.   Neck:      Thyroid: No thyroid mass or thyromegaly.   Cardiovascular:      Rate and Rhythm: Normal rate and regular rhythm.      Heart sounds: No murmur heard.  Pulmonary:      Effort: Pulmonary effort is normal. No retractions.      Breath sounds: Normal breath sounds. No wheezing, rhonchi or rales.   Chest:      Chest wall: No mass or tenderness.   Breasts:     Right: Normal. No mass, nipple discharge, skin change or tenderness.      Left: Normal. No mass, nipple discharge, skin change or tenderness.   Abdominal:      General: Bowel sounds are normal.      Palpations: Abdomen is soft. Abdomen is not rigid. There is no mass.      Tenderness: There is no abdominal tenderness. There is no guarding.      Hernia: No hernia is present. There is no hernia in the left inguinal area.   Genitourinary:     Labia:         Right: No rash, tenderness or lesion.         Left: No rash, tenderness or lesion.       Vagina: Normal. No vaginal discharge or lesions.      Cervix: No cervical motion tenderness, discharge, lesion or cervical bleeding.      Uterus: Normal. Not enlarged, not fixed and not tender.       Adnexa:         Right: No mass or tenderness.          Left: No mass or tenderness.        Rectum: No external hemorrhoid.   Musculoskeletal:      Cervical back: Normal range of motion. No muscular tenderness.   Neurological:      Mental Status: She is alert and oriented to person, place, and time.   Psychiatric:         Behavior: Behavior normal.            Assessment and Plan    Problem List Items Addressed This Visit    None  Visit Diagnoses     Well " woman exam with routine gynecological exam    -  Primary    Relevant Orders    LIQUID-BASED PAP SMEAR, [facility] ([facility])    Women's annual routine gynecological examination        Encounter for breast cancer screening using non-mammogram modality        Relevant Orders    Mammo Screening Digital Tomosynthesis Bilateral With CAD    Postmenopausal status        Relevant Medications    estrogens, conjugated,-methyltestosterone (ESTRATEST HS) 0.625-1.25 MG per tablet    Menopausal symptoms        Relevant Medications    estrogens, conjugated,-methyltestosterone (ESTRATEST HS) 0.625-1.25 MG per tablet          1. GYN annual well woman exam.   2. Recommended use of Vitamin D replacement and getting adequate calcium in her diet. (1500mg)  3. Reviewed monthly self breast exams.  Instructed to call with lumps, pain, or breast discharge.    4. Continue yearly mammography  5. Reviewed HPV guidelines.  6. Reviewed exercise as a preventative health measures.   7. Menopausal symptoms.  Discussed options and reviewed risks/benefits of HRT including increased risk of breast cancer, VTE and heart disease.  There is evidence HRT decreases hip fracture, colon cancer and all cause mortality.  Patient would like to start HRT.  She understands she will use the lowest dose that adequately controls her symptoms.  8. RTC 3 months.    Jenelle Lorenzana MD  12/06/2022

## 2022-12-08 LAB — REF LAB TEST METHOD: NORMAL

## 2023-03-07 ENCOUNTER — OFFICE VISIT (OUTPATIENT)
Dept: OBSTETRICS AND GYNECOLOGY | Facility: CLINIC | Age: 53
End: 2023-03-07
Payer: COMMERCIAL

## 2023-03-07 VITALS
DIASTOLIC BLOOD PRESSURE: 76 MMHG | SYSTOLIC BLOOD PRESSURE: 114 MMHG | BODY MASS INDEX: 19.91 KG/M2 | HEIGHT: 68 IN | WEIGHT: 131.4 LBS

## 2023-03-07 DIAGNOSIS — Z78.0 POSTMENOPAUSAL STATUS: ICD-10-CM

## 2023-03-07 DIAGNOSIS — N95.1 MENOPAUSAL SYMPTOMS: ICD-10-CM

## 2023-03-07 DIAGNOSIS — Z79.890 HORMONE REPLACEMENT THERAPY: Primary | ICD-10-CM

## 2023-03-07 PROCEDURE — 99213 OFFICE O/P EST LOW 20 MIN: CPT | Performed by: OBSTETRICS & GYNECOLOGY

## 2023-03-07 RX ORDER — PROGESTERONE 100 MG/1
100 CAPSULE ORAL DAILY
Qty: 90 CAPSULE | Refills: 3 | Status: SHIPPED | OUTPATIENT
Start: 2023-03-07

## 2023-03-07 RX ORDER — FLUOXETINE HYDROCHLORIDE 40 MG/1
CAPSULE ORAL
COMMUNITY
Start: 2023-03-02 | End: 2023-03-07 | Stop reason: SDUPTHER

## 2023-03-07 RX ORDER — ESTERIFIED ESTROGEN AND METHYLTESTOSTERONE .625; 1.25 MG/1; MG/1
1 TABLET ORAL DAILY
Qty: 30 TABLET | Refills: 5 | Status: SHIPPED | OUTPATIENT
Start: 2023-03-07 | End: 2023-04-06

## 2023-03-07 RX ORDER — ESTRADIOL 10 UG/1
1 INSERT VAGINAL 2 TIMES WEEKLY
Qty: 8 TABLET | Refills: 11 | Status: SHIPPED | OUTPATIENT
Start: 2023-03-09

## 2023-03-07 RX ORDER — FLUOXETINE HYDROCHLORIDE 40 MG/1
40 CAPSULE ORAL DAILY
Qty: 30 CAPSULE | Refills: 2 | Status: SHIPPED | OUTPATIENT
Start: 2023-03-07

## 2023-03-07 NOTE — PROGRESS NOTES
Chief Complaint   Patient presents with   • Follow-up     HRT       Subjective   HPI  Roberta Dai is a 52 y.o. female, No obstetric history on file. Patient is postmenopausal. She presents for follow up on HRT.      At her last visit on 22, she was treated with ESTRATEST, Vagifem & Prometrium. Since then she reports her symptoms/issue has improved. The patient reports additional symptoms as none.      Patient requesting refills on ESTRATEST, Vagifem, & Prometrium.      Additional OB/GYN History     Last Pap :   Last Completed Pap Smear          PAP SMEAR (Every 3 Years) Next due on 2022  LIQUID-BASED PAP SMEAR, [facility] ([facility])                Last mammogram:   Last Completed Mammogram     This patient has no relevant Health Maintenance data.          Tobacco Usage?: No   OB History    No obstetric history on file.           Current Outpatient Medications:   •  Cosentyx Sensoready, 300 MG, 150 MG/ML solution auto-injector, , Disp: , Rfl:   •  [START ON 3/9/2023] estradiol (Vagifem) 10 MCG tablet vaginal tablet, Insert 1 tablet into the vagina 2 (Two) Times a Week., Disp: 8 tablet, Rfl: 11  •  estrogens, conjugated,-methyltestosterone (ESTRATEST HS) 0.625-1.25 MG per tablet, Take 1 tablet by mouth Daily for 30 days., Disp: 30 tablet, Rfl: 5  •  FLUoxetine (PROzac) 40 MG capsule, Take 1 capsule by mouth Daily., Disp: 30 capsule, Rfl: 2  •  l-methylfolate 15 MG tablet tablet, , Disp: , Rfl:   •  perphenazine 16 MG tablet, Take 1 tablet by mouth Daily., Disp: , Rfl:   •  Progesterone (Prometrium) 100 MG capsule, Take 1 capsule by mouth Daily., Disp: 90 capsule, Rfl: 3     Past Medical History:   Diagnosis Date   • Depression     Struggled with depression for a long time        Past Surgical History:   Procedure Laterality Date   •  SECTION WITH TUBAL  [id number]   • COLONOSCOPY      partial   • FOOT SURGERY Left 2020    (L) CHEVRON/AKIN OSTEOTOMY  "Dr. Flores   • NASAL SEPTUM SURGERY     • WISDOM TOOTH EXTRACTION  1985       The additional following portions of the patient's history were reviewed and updated as appropriate: allergies and current medications.    Review of Systems    I have reviewed and agree with the HPI, ROS, and historical information as entered above. Jenelle Lorenzana MD    Objective   /76   Ht 172.7 cm (68\")   Wt 59.6 kg (131 lb 6.4 oz)   LMP  (LMP Unknown)   BMI 19.98 kg/m²     Physical Exam  Constitutional:       Appearance: She is well-developed.   HENT:      Head: Normocephalic.   Eyes:      Conjunctiva/sclera: Conjunctivae normal.   Pulmonary:      Effort: Pulmonary effort is normal.   Psychiatric:         Behavior: Behavior normal.         Assessment & Plan     Assessment     Problem List Items Addressed This Visit    None  Visit Diagnoses     Hormone replacement therapy    -  Primary    Postmenopausal status        Relevant Medications    estrogens, conjugated,-methyltestosterone (ESTRATEST HS) 0.625-1.25 MG per tablet    Menopausal symptoms        Relevant Medications    estrogens, conjugated,-methyltestosterone (ESTRATEST HS) 0.625-1.25 MG per tablet            Plan     1.  She is feeling overall much better on the current HRT regimen.  She is out of the Estratest and Prozac and asking for refills.  (Her PCP had surgery).  She is thinking more clearly and wants to continue current regimen.   2.  F/U at time of annual.      Jenelle Lorenzana MD  03/07/2023  "

## 2023-07-29 DIAGNOSIS — Z78.0 POSTMENOPAUSAL STATUS: ICD-10-CM

## 2023-07-29 DIAGNOSIS — N95.1 MENOPAUSAL SYMPTOMS: ICD-10-CM

## 2023-10-09 DIAGNOSIS — N95.1 MENOPAUSAL SYMPTOMS: ICD-10-CM

## 2023-10-09 DIAGNOSIS — Z78.0 POSTMENOPAUSAL STATUS: ICD-10-CM

## 2023-10-09 NOTE — TELEPHONE ENCOUNTER
Caller: Roberta Wilkins    Relationship: Self    Best call back number: [phone]    Requested Prescriptions:   Requested Prescriptions     Pending Prescriptions Disp Refills    estrogens, conjugated,-methyltestosterone (Est Estrogens-Methyltest HS) 0.625-1.25 MG per tablet 30 tablet 1     Sig: Take 1 tablet by mouth Daily.        Pharmacy where request should be sent: [address] [phone] [facility] [phone] [facility]       Last office visit with prescribing clinician: 3/7/2023   Last telemedicine visit with prescribing clinician: Visit date not found   Next office visit with prescribing clinician: 10/12/2023     Additional details provided by patient: PT STATES SHE HAS ENOUGH MEDICATION FOR ONE MORE DAY, PT IS REQUESTING IF SHE CAN HAVE ADDITIONAL REFILLS UNTIL HER NEXT APPT. PT IS SCHEDULED FOR A F/U APPT FOR HER MEDICATION ON 10/17/23 AND IS NEEDING TO CANCEL DUE TO PT BEING OUT OF TOWN. PT IS WANTING TO KNOW IF SHE CAN WAIT UNTIL HER ANNUAL APPT ON 12/7/23 OR IF SHE NEEDS TO BE SEEN BEFORE THEN FOR A FOLLOW UP APPT FOR THE MEDICATION. PLEASE ADVISE PT ON SCHEDULING.    Does the patient have less than a 3 day supply:  [x] Yes  [] No    Would you like a call back once the refill request has been completed: [x] Yes [] No    If the office needs to give you a call back, can they leave a voicemail: [x] Yes [] No    Justin Messer Rep   10/09/23 10:10 EDT

## 2023-12-07 ENCOUNTER — OFFICE VISIT (OUTPATIENT)
Dept: OBSTETRICS AND GYNECOLOGY | Facility: CLINIC | Age: 53
End: 2023-12-07
Payer: COMMERCIAL

## 2023-12-07 VITALS
HEIGHT: 68 IN | SYSTOLIC BLOOD PRESSURE: 112 MMHG | WEIGHT: 130.4 LBS | BODY MASS INDEX: 19.76 KG/M2 | DIASTOLIC BLOOD PRESSURE: 82 MMHG

## 2023-12-07 DIAGNOSIS — Z79.890 HORMONE REPLACEMENT THERAPY: ICD-10-CM

## 2023-12-07 DIAGNOSIS — Z12.39 ENCOUNTER FOR BREAST CANCER SCREENING USING NON-MAMMOGRAM MODALITY: ICD-10-CM

## 2023-12-07 DIAGNOSIS — Z78.0 POSTMENOPAUSAL STATUS: ICD-10-CM

## 2023-12-07 DIAGNOSIS — Z87.42 HISTORY OF ABNORMAL CERVICAL PAP SMEAR: ICD-10-CM

## 2023-12-07 DIAGNOSIS — Z01.419 WOMEN'S ANNUAL ROUTINE GYNECOLOGICAL EXAMINATION: Primary | ICD-10-CM

## 2023-12-07 DIAGNOSIS — N95.1 MENOPAUSAL SYMPTOMS: ICD-10-CM

## 2023-12-07 DIAGNOSIS — R68.82 DECREASED LIBIDO: ICD-10-CM

## 2023-12-07 RX ORDER — TILDRAKIZUMAB-ASMN 100 MG/ML
100 INJECTION, SOLUTION SUBCUTANEOUS
COMMUNITY

## 2023-12-07 RX ORDER — PROGESTERONE 100 MG/1
100 CAPSULE ORAL DAILY
Qty: 90 CAPSULE | Refills: 3 | Status: SHIPPED | OUTPATIENT
Start: 2023-12-07

## 2023-12-07 RX ORDER — PROPRANOLOL HYDROCHLORIDE 10 MG/1
10 TABLET ORAL DAILY
COMMUNITY
Start: 2023-09-08

## 2023-12-07 NOTE — PROGRESS NOTES
"     Gynecologic Annual Exam Note        GYN Annual Exam     CC - Here for annual exam.        HPI  Roberta Dai is a 53 y.o. female, , who presents for annual well woman exam as a established patient. She is postmenopausal. Denies vaginal bleeding. Patient reports problems with: decreased libido. Patient states that she has always had decreased libido since menopause, but has gotten worse. There were no changes to her medical or surgical history since her last visit. Partner Status: Marital Status: . She is is sexually active. She has not had new partners. STD testing recommendations have been explained to the patient and she does not desire STD testing.    Additional OB/GYN History   On HRT? Yes. Details: Estrogen Methyltest 0.625-1.25MG and Prometrium 100MG    Last Pap : 2022. Results: ASCUS. HPV: negative.   Last Completed Pap Smear            PAP SMEAR (Every 3 Years) Order placed this encounter      2022  LIQUID-BASED PAP SMEAR, [facility] ([facility])                  History of abnormal Pap smear: yes - h/o ASCUS, HPV negative  Family history of uterine, colon, breast, or ovarian cancer: no  Performs monthly Self-Breast Exam: no  Last mammogram: \"2017\" per last annual note. Patient does not remember when her last mammogram was.   Last Completed Mammogram       This patient has no relevant Health Maintenance data.          Last colonoscopy: has had a colonoscopy approximately 5 year(s) ago.  Last Completed Colonoscopy       This patient has no relevant Health Maintenance data.              Last bone density scan (DEXA): None  Exercises Regularly: no  Feelings of Anxiety or Depression: yes - Anxiety- on Prozac 40MG      Tobacco Usage?: No       Current Outpatient Medications:     estrogens, conjugated,-methyltestosterone (Est Estrogens-Methyltest HS) 0.625-1.25 MG per tablet, Take 1 tablet by mouth Daily., Disp: 30 tablet, Rfl: 3    FLUoxetine (PROzac) 40 MG capsule, Take 1 " capsule by mouth Daily., Disp: 30 capsule, Rfl: 2    l-methylfolate 15 MG tablet tablet, , Disp: , Rfl:     Progesterone (Prometrium) 100 MG capsule, Take 1 capsule by mouth Daily., Disp: 90 capsule, Rfl: 3    propranolol (INDERAL) 10 MG tablet, Take 1 tablet by mouth Daily., Disp: , Rfl:     Tildrakizumab-asmn (Ilumya) 100 MG/ML injection, Inject 1 mL under the skin into the appropriate area as directed Every 3 (Three) Months., Disp: , Rfl:     Patient is requesting refills of HRT.    OB History          2    Para   2    Term   2            AB        Living   2         SAB        IAB        Ectopic        Molar        Multiple        Live Births   2                Past Medical History:   Diagnosis Date    Anxiety     On medicine    Depression 2017    Struggled with depression for a long time    PONV (postoperative nausea and vomiting)     Psoriasis         Past Surgical History:   Procedure Laterality Date     SECTION WITH TUBAL  [id number]    COLECTOMY PARTIAL / TOTAL      partial    COLONOSCOPY      FOOT SURGERY Left 2020    (L) CHEVRON/AKIN OSTEOTOMY Dr. Flores    NASAL SEPTUM SURGERY      WISDOM TOOTH EXTRACTION         Health Maintenance   Topic Date Due    MAMMOGRAM  2017    ZOSTER VACCINE (1 of 2) Never done    HEPATITIS C SCREENING  Never done    ANNUAL PHYSICAL  Never done    INFLUENZA VACCINE  Never done    COVID-19 Vaccine (2023-24 season) 2023    Annual Gynecologic Pelvic and Breast Exam  2023    TDAP/TD VACCINES (2 - Td or Tdap) 2023    PAP SMEAR  2025    Pneumococcal Vaccine 0-64  Aged Out       The additional following portions of the patient's history were reviewed and updated as appropriate: allergies, current medications, past family history, past medical history, past social history, past surgical history, and problem list.    Review of Systems   Constitutional: Negative.    HENT: Negative.     Eyes: Negative.   "  Respiratory: Negative.     Cardiovascular: Negative.    Gastrointestinal: Negative.    Endocrine: Negative.    Genitourinary: Negative.  Positive for decreased libido.   Musculoskeletal: Negative.    Skin: Negative.    Allergic/Immunologic: Negative.    Neurological: Negative.    Hematological: Negative.    Psychiatric/Behavioral: Negative.         I have reviewed and agree with the HPI, ROS, and historical information as entered above. Jenelle Lorenzana MD      Objective   /82   Ht 172.7 cm (68\")   Wt 59.1 kg (130 lb 6.4 oz)   LMP  (LMP Unknown)   BMI 19.83 kg/m²     Physical Exam  Vitals and nursing note reviewed. Exam conducted with a chaperone present.   Constitutional:       Appearance: She is well-developed.   HENT:      Head: Normocephalic and atraumatic.   Neck:      Thyroid: No thyroid mass or thyromegaly.   Cardiovascular:      Rate and Rhythm: Normal rate and regular rhythm.      Heart sounds: No murmur heard.  Pulmonary:      Effort: Pulmonary effort is normal. No retractions.      Breath sounds: Normal breath sounds. No wheezing, rhonchi or rales.   Chest:      Chest wall: No mass or tenderness.   Breasts:     Right: Normal. No mass, nipple discharge, skin change or tenderness.      Left: Normal. No mass, nipple discharge, skin change or tenderness.   Abdominal:      General: Bowel sounds are normal.      Palpations: Abdomen is soft. Abdomen is not rigid. There is no mass.      Tenderness: There is no abdominal tenderness. There is no guarding.      Hernia: No hernia is present. There is no hernia in the left inguinal area.   Genitourinary:     Labia:         Right: No rash, tenderness or lesion.         Left: No rash, tenderness or lesion.       Vagina: Normal. No vaginal discharge or lesions.      Cervix: No cervical motion tenderness, discharge, lesion or cervical bleeding.      Uterus: Normal. Not enlarged, not fixed and not tender.       Adnexa:         Right: No mass or tenderness.     "      Left: No mass or tenderness.        Rectum: No external hemorrhoid.   Musculoskeletal:      Cervical back: Normal range of motion. No muscular tenderness.   Neurological:      Mental Status: She is alert and oriented to person, place, and time.   Psychiatric:         Behavior: Behavior normal.            Assessment and Plan    Problem List Items Addressed This Visit    None  Visit Diagnoses       Women's annual routine gynecological examination    -  Primary    History of abnormal cervical Pap smear        Postmenopausal status        Menopausal symptoms        Hormone replacement therapy        Encounter for breast cancer screening using non-mammogram modality        Decreased libido                GYN annual well woman exam.   Recommended use of Vitamin D replacement and getting adequate calcium in her diet. (1500mg)  Reviewed monthly self breast exams.  Instructed to call with lumps, pain, or breast discharge.    Continue yearly mammography  Reviewed HPV guidelines.  Reviewed exercise as a preventative health measures.   Decreased libido - She is on Prozac and discussed adding Wellbutrin.  She will talk to her provider.  Info on Restore Flow Allografts aric.  Reviewed risks/benefits of HRT, including possible increased risk of breast cancer, heart disease, blood clots and strokes. Patient strongly wants to stay on HRT.            Jenelle Lorenzana MD  12/07/2023

## 2023-12-12 LAB — REF LAB TEST METHOD: NORMAL

## 2024-06-04 ENCOUNTER — HOSPITAL ENCOUNTER (OUTPATIENT)
Dept: MAMMOGRAPHY | Facility: HOSPITAL | Age: 54
Discharge: HOME | End: 2024-06-04
Admitting: OBSTETRICS & GYNECOLOGY
Payer: COMMERCIAL

## 2024-06-04 DIAGNOSIS — Z12.39 ENCOUNTER FOR BREAST CANCER SCREENING USING NON-MAMMOGRAM MODALITY: ICD-10-CM

## 2024-06-04 PROCEDURE — 77067 SCR MAMMO BI INCL CAD: CPT

## 2024-06-04 PROCEDURE — 77063 BREAST TOMOSYNTHESIS BI: CPT

## 2024-07-10 DIAGNOSIS — Z78.0 POSTMENOPAUSAL STATUS: ICD-10-CM

## 2024-07-10 DIAGNOSIS — N95.1 MENOPAUSAL SYMPTOMS: ICD-10-CM

## 2024-07-10 NOTE — TELEPHONE ENCOUNTER
Last annual 12/07/23  No future appt.    Received fax from [facility] for a refill of Est Estrogens-Methyltest HS 0.625-1.25 MG. Rx refill pended for LOS approval.

## 2025-02-04 DIAGNOSIS — N95.1 MENOPAUSAL SYMPTOMS: ICD-10-CM

## 2025-02-04 DIAGNOSIS — Z78.0 POSTMENOPAUSAL STATUS: ICD-10-CM

## 2025-04-28 ENCOUNTER — TELEPHONE (OUTPATIENT)
Dept: OBSTETRICS AND GYNECOLOGY | Facility: CLINIC | Age: 55
End: 2025-04-28
Payer: COMMERCIAL

## 2025-04-28 NOTE — TELEPHONE ENCOUNTER
Spoke with pt, she hasn't been seen since 12/2023 so we are unable to fill her HRT without seeing her. Dr. Lorenzana does not have any annual appts in the next several months, so scheduled her with me for annual on Wednesday.

## 2025-04-28 NOTE — TELEPHONE ENCOUNTER
Patient stated, a couple of her medications had  and she wanted to get them refilled.     Patient would like a phone call back.

## 2025-04-30 ENCOUNTER — OFFICE VISIT (OUTPATIENT)
Dept: OBSTETRICS AND GYNECOLOGY | Facility: CLINIC | Age: 55
End: 2025-04-30
Payer: COMMERCIAL

## 2025-04-30 VITALS
BODY MASS INDEX: 18.73 KG/M2 | HEIGHT: 68 IN | WEIGHT: 123.6 LBS | DIASTOLIC BLOOD PRESSURE: 74 MMHG | SYSTOLIC BLOOD PRESSURE: 116 MMHG

## 2025-04-30 DIAGNOSIS — Z01.419 WOMEN'S ANNUAL ROUTINE GYNECOLOGICAL EXAMINATION: Primary | ICD-10-CM

## 2025-04-30 DIAGNOSIS — N95.1 MENOPAUSAL SYMPTOMS: ICD-10-CM

## 2025-04-30 DIAGNOSIS — Z78.0 POSTMENOPAUSAL STATUS: ICD-10-CM

## 2025-04-30 DIAGNOSIS — Z12.31 SCREENING MAMMOGRAM FOR BREAST CANCER: ICD-10-CM

## 2025-04-30 RX ORDER — LAMOTRIGINE 150 MG/1
TABLET ORAL
COMMUNITY
Start: 2025-03-17

## 2025-04-30 RX ORDER — ESTERIFIED ESTROGENS AND METHYLTESTOSTERONE .625; 1.25 MG/1; MG/1
1 TABLET ORAL DAILY
Qty: 30 TABLET | Refills: 5 | Status: CANCELLED | OUTPATIENT
Start: 2025-04-30

## 2025-04-30 RX ORDER — PROGESTERONE 100 MG/1
100 CAPSULE ORAL DAILY
Qty: 90 CAPSULE | Refills: 4 | Status: SHIPPED | OUTPATIENT
Start: 2025-04-30

## 2025-04-30 RX ORDER — ESTERIFIED ESTROGENS AND METHYLTESTOSTERONE .625; 1.25 MG/1; MG/1
1 TABLET ORAL DAILY
Qty: 30 TABLET | Refills: 0 | OUTPATIENT
Start: 2025-04-30

## 2025-04-30 RX ORDER — BETAMETHASONE VALERATE 1.2 MG/G
CREAM TOPICAL
COMMUNITY
Start: 2025-01-15

## 2025-04-30 NOTE — PROGRESS NOTES
Gynecologic Annual Exam Note        GYN Annual Exam     CC - Here for annual exam.        HPI  Roberta Dai is a 55 y.o. female, , who presents for annual well woman exam as an established patient.  She is postmenopausal. Denies vaginal bleeding.   There were no changes to her medical or surgical history since her last visit. Marital Status: .  She is sexually active. She has not had new partners. STD testing recommendations have been explained to the patient and she declines STD testing.    The patient would like to discuss the following complaints today: none    Additional OB/GYN History   On HRT? Yes. Details: estrogen/testosterone and progesterone     Last Pap : 2023. Results: ASCUS. HPV: negative.   Last Completed Pap Smear            Upcoming       PAP SMEAR (Every 3 Years) Next due on 2023  LIQUID-BASED PAP SMEAR WITH HPV GENOTYPING REGARDLESS OF INTERPRETATION ([facility])    2022  LIQUID-BASED PAP SMEAR, [facility] ([facility])                          History of abnormal Pap smear: yes - hx of ASCUS, HPV-Negative   Family history of uterine, colon, breast, or ovarian cancer: no  Performs monthly Self-Breast Exam: yes  Last mammogram: 2024. Done at . There is a copy in the chart.    Last Completed Mammogram            Awaiting Completion       MAMMOGRAM (Every 2 Years) Order placed this encounter      2025  Order placed for Mammo Screening Digital Tomosynthesis Bilateral With CAD by Ananya Garibay APRN    2024  Mammo Screening Digital Tomosynthesis Bilateral With CAD    2015  Mammo screening bilateral    2014  MAMMOGRAPHY SCREENING BILATERAL                          Last colonoscopy:  2018 at [facility]  sees Dr. Javed and he's done a partial colectomy  .  Last Completed Colonoscopy            Completed or No Longer Recommended       COLORECTAL CANCER SCREENING  Discontinued        Frequency changed  to Never automatically (Topic No Longer Applies)    2018  Outside Procedure: COLONOSCOPY    2014  Outside Procedure: COLONOSCOPY                            She has never had a bone density scan  Exercises Regularly: yes-walking  Feelings of Anxiety or Depression: yes - managed with medication       Tobacco Usage?: No       Current Outpatient Medications:     betamethasone valerate (VALISONE) 0.1 % cream, , Disp: , Rfl:     estrogens, conjugated,-methyltestosterone (Est Estrogens-Methyltest HS) 0.625-1.25 MG per tablet, Take 1 tablet by mouth Daily., Disp: 30 tablet, Rfl: 5    FLUoxetine (PROzac) 40 MG capsule, Take 1 capsule by mouth Daily., Disp: 30 capsule, Rfl: 2    l-methylfolate 15 MG tablet tablet, , Disp: , Rfl:     lamoTRIgine (LaMICtal) 150 MG tablet, , Disp: , Rfl:     Progesterone (Prometrium) 100 MG capsule, Take 1 capsule by mouth Daily., Disp: 90 capsule, Rfl: 4    propranolol (INDERAL) 10 MG tablet, Take 1 tablet by mouth Daily., Disp: , Rfl:     Tildrakizumab-asmn (Ilumya) 100 MG/ML injection, Inject 1 mL under the skin into the appropriate area as directed Every 3 (Three) Months., Disp: , Rfl:     Patient is requesting refills of estrogen and progesterone.    OB History          2    Para   2    Term   2            AB        Living   2         SAB        IAB        Ectopic        Molar        Multiple        Live Births   2                Past Medical History:   Diagnosis Date    Anxiety     On medicine    Depression 2017    Struggled with depression for a long time    PONV (postoperative nausea and vomiting) 1998    Psoriasis         Past Surgical History:   Procedure Laterality Date     SECTION WITH TUBAL  [id number]    COLECTOMY PARTIAL / TOTAL      partial    COLONOSCOPY      FOOT SURGERY Left 2020    (L) CHEVRON/AKIN OSTEOTOMY Dr. Flores    NASAL SEPTUM SURGERY      WISDOM TOOTH EXTRACTION  1985       Health Maintenance   Topic Date Due    Pneumococcal  "Vaccine 50+ (1 of 1 - PCV) Never done    ZOSTER VACCINE (1 of 2) Never done    HEPATITIS C SCREENING  Never done    ANNUAL PHYSICAL  Never done    TDAP/TD VACCINES (2 - Td or Tdap) 12/19/2023    COVID-19 Vaccine (4 - 2024-25 season) 09/01/2024    Annual Gynecologic Pelvic and Breast Exam  12/08/2024    INFLUENZA VACCINE  07/01/2025    MAMMOGRAM  06/04/2026    PAP SMEAR  12/07/2026    COLORECTAL CANCER SCREENING  Discontinued       The additional following portions of the patient's history were reviewed and updated as appropriate: allergies, current medications, past family history, past medical history, past social history, and past surgical history.    Review of Systems   Constitutional: Negative.    Respiratory: Negative.     Cardiovascular: Negative.    Gastrointestinal: Negative.    Genitourinary: Negative.    Psychiatric/Behavioral: Negative.         I have reviewed and agree with the HPI, ROS, and historical information as entered above. SAPNA Mercado      Objective   /74 (BP Location: Right arm, Patient Position: Sitting, Cuff Size: Adult)   Ht 172.7 cm (67.99\")   Wt 56.1 kg (123 lb 9.6 oz)   LMP  (LMP Unknown)   BMI 18.80 kg/m²     Physical Exam  Vitals and nursing note reviewed. Exam conducted with a chaperone present.   Constitutional:       General: She is not in acute distress.     Appearance: Normal appearance. She is well-developed and normal weight. She is not ill-appearing.   Neck:      Thyroid: No thyroid mass or thyromegaly.   Pulmonary:      Effort: Pulmonary effort is normal. No respiratory distress or retractions.   Chest:      Chest wall: No mass.   Breasts:     Right: Normal. No mass, nipple discharge, skin change or tenderness.      Left: Normal. No mass, nipple discharge, skin change or tenderness.   Abdominal:      General: There is no distension.      Palpations: Abdomen is soft. Abdomen is not rigid. There is no mass.      Tenderness: There is no abdominal tenderness. There " is no guarding or rebound.      Hernia: No hernia is present.   Genitourinary:     Exam position: Lithotomy position.      Labia:         Right: No rash, tenderness or lesion.         Left: No rash, tenderness or lesion.       Vagina: Normal. No vaginal discharge, bleeding or lesions.      Cervix: Normal. No cervical motion tenderness, discharge or cervical bleeding.      Uterus: Normal. Not enlarged, not fixed and not tender.       Adnexa: Right adnexa normal and left adnexa normal.        Right: No mass or tenderness.          Left: No mass or tenderness.        Comments: Pale atrophic tissue  Musculoskeletal:      Cervical back: No muscular tenderness.   Skin:     General: Skin is warm and dry.   Neurological:      Mental Status: She is alert and oriented to person, place, and time.   Psychiatric:         Mood and Affect: Mood normal.         Behavior: Behavior normal.            Assessment and Plan    Problem List Items Addressed This Visit       Menopausal symptoms    Relevant Medications    estrogens, conjugated,-methyltestosterone (Est Estrogens-Methyltest HS) 0.625-1.25 MG per tablet     Other Visit Diagnoses         Women's annual routine gynecological examination    -  Primary      Screening mammogram for breast cancer        Relevant Orders    Mammo Screening Digital Tomosynthesis Bilateral With CAD      Postmenopausal status                GYN annual well woman exam.   Reviewed pap guidelines, will be due next year.   Dr. Lorenzana to refill the estrogen/methyltest since it is controlled, so refills can be sent for a full year.   Reviewed monthly self breast exams.  Instructed to call with lumps, pain, or breast discharge.  Yearly mammograms ordered.  Recommended use of Vitamin D and getting adequate calcium in her diet. (1500mg)  Reviewed exercise as a preventative health measures.   Reviewed risks of ERT including increased risk of breast cancer, increased blood clots, increased heart disease.  Patient  strongly desires to stay on or start ERT.  She understands we will use the lowest amount that adequately controls her symptoms.  RTC in 1 year or PRN with problems.  Return in about 1 year (around 4/30/2026) for Annual physical.         Ananya Garibay, APRN  04/30/2025

## 2025-05-05 DIAGNOSIS — N95.1 MENOPAUSAL SYMPTOMS: ICD-10-CM

## 2025-05-05 DIAGNOSIS — Z78.0 POSTMENOPAUSAL STATUS: ICD-10-CM

## 2025-05-05 RX ORDER — ESTERIFIED ESTROGENS AND METHYLTESTOSTERONE .625; 1.25 MG/1; MG/1
1 TABLET ORAL DAILY
Qty: 30 TABLET | Refills: 5 | Status: SHIPPED | OUTPATIENT
Start: 2025-05-05

## 2025-06-08 LAB
NCCN CRITERIA FLAG: NORMAL
TYRER CUZICK SCORE: 7.7

## 2025-06-09 ENCOUNTER — HOSPITAL ENCOUNTER (OUTPATIENT)
Dept: MAMMOGRAPHY | Facility: HOSPITAL | Age: 55
Discharge: HOME | End: 2025-06-09
Admitting: ADVANCED PRACTICE MIDWIFE
Payer: COMMERCIAL

## 2025-06-09 DIAGNOSIS — Z12.31 SCREENING MAMMOGRAM FOR BREAST CANCER: ICD-10-CM

## 2025-06-09 PROCEDURE — 77063 BREAST TOMOSYNTHESIS BI: CPT

## 2025-06-09 PROCEDURE — 77067 SCR MAMMO BI INCL CAD: CPT
